# Patient Record
Sex: FEMALE | Race: BLACK OR AFRICAN AMERICAN
[De-identification: names, ages, dates, MRNs, and addresses within clinical notes are randomized per-mention and may not be internally consistent; named-entity substitution may affect disease eponyms.]

---

## 2018-07-24 ENCOUNTER — HOSPITAL ENCOUNTER (OUTPATIENT)
Dept: HOSPITAL 62 - RAD | Age: 23
End: 2018-07-24
Attending: NURSE PRACTITIONER
Payer: SELF-PAY

## 2018-07-24 DIAGNOSIS — Z34.82: Primary | ICD-10-CM

## 2018-07-24 PROCEDURE — 76801 OB US < 14 WKS SINGLE FETUS: CPT

## 2018-07-24 NOTE — RADIOLOGY REPORT (SQ)
EXAM DESCRIPTION:  U/S [id number] TRNABD 1GES W/ODOP



COMPLETED DATE/TIME:  7/24/2018 3:37 pm



REASON FOR STUDY:  ENCOUNTER FOR SUPRVSN OF NORMAL PREGNANCY, SECOND TRIMESTER Z34.82  ENCOUNTER FOR 
SUPRVSN OF NORMAL PREGNANCY, SECOND TRI



COMPARISON:  None.



TECHNIQUE:  Static and Dynamic grayscale imaging performed of gravid uterus using transabdominal appr
oac.  Additional selected color Doppler and spectral images recorded.  All stored on PACS.



LIMITATIONS:  None.



FINDINGS:  EGA: 21 weeks 4 days

JEANETTE: 11/30/2018

EFW: 434 grams

PERCENTILE: Not calculated.

RENETTA: 13.5

PLACENTA: Posterior.  GRADE: I

FETAL PRESENTATION: Cephalic.

FETAL ANATOMY:

FETAL HEART RATE: 150 beats per minute.

FOUR CHAMBER HEART: Visualized.

THREE VESSEL CORD: Yes.

CORD INSERTION: Visualized.

KIDNEYS AND BLADDER: Visualized. Appear normal.

STOMACH: Visualized. Appears normal.

SPINE: Normal as visualized.

BRAIN AND LATERAL VENTRICLES: Visualized. Appear normal.

OTHER: No other significant finding.

MATERNAL ADNEXA: Maternal ovaries not visualized.

CERVICAL LENGTH: 3.8 cm   Closed.

OTHER: No other significant finding.



IMPRESSION:  LIVING INTRAUTERINE PREGNANCY.

ESTIMATED GESTATIONAL AGE 21 weeks 4 days.

NO VISUALIZED ANOMALIES.

Trimester of pregnancy: Second trimester - 13 weeks 1 day to 27 weeks 6 days.



TECHNICAL DOCUMENTATION:  JOB ID:  [id number]

 2011 [employer]- All Rights Reserved



Reading location - IP/workstation name: [email or web address]

## 2018-11-11 ENCOUNTER — HOSPITAL ENCOUNTER (INPATIENT)
Dept: HOSPITAL 62 - LC | Age: 23
LOS: 2 days | Discharge: HOME | End: 2018-11-13
Attending: OBSTETRICS & GYNECOLOGY | Admitting: OBSTETRICS & GYNECOLOGY
Payer: MEDICAID

## 2018-11-11 DIAGNOSIS — F14.10: ICD-10-CM

## 2018-11-11 DIAGNOSIS — F17.210: ICD-10-CM

## 2018-11-11 DIAGNOSIS — Z23: ICD-10-CM

## 2018-11-11 DIAGNOSIS — Z3A.38: ICD-10-CM

## 2018-11-11 DIAGNOSIS — O99.323: ICD-10-CM

## 2018-11-11 DIAGNOSIS — F12.10: ICD-10-CM

## 2018-11-11 DIAGNOSIS — D50.9: ICD-10-CM

## 2018-11-11 LAB
ADD MANUAL DIFF: NO
BARBITURATES UR QL SCN: NEGATIVE
BASOPHILS # BLD AUTO: 0 10^3/UL (ref 0–0.2)
BASOPHILS NFR BLD AUTO: 0.2 % (ref 0–2)
CHLAM PCR: NOT DETECTED
EOSINOPHIL # BLD AUTO: 0.1 10^3/UL (ref 0–0.6)
EOSINOPHIL NFR BLD AUTO: 0.8 % (ref 0–6)
ERYTHROCYTE [DISTWIDTH] IN BLOOD BY AUTOMATED COUNT: 14.2 % (ref 11.5–14)
GON PCR: NOT DETECTED
HCT VFR BLD CALC: 30.4 % (ref 36–47)
HGB BLD-MCNC: 10.1 G/DL (ref 12–15.5)
LYMPHOCYTES # BLD AUTO: 1.5 10^3/UL (ref 0.5–4.7)
LYMPHOCYTES NFR BLD AUTO: 20.2 % (ref 13–45)
MCH RBC QN AUTO: 29.9 PG (ref 27–33.4)
MCHC RBC AUTO-ENTMCNC: 33.4 G/DL (ref 32–36)
MCV RBC AUTO: 90 FL (ref 80–97)
METHADONE UR QL SCN: NEGATIVE
MONOCYTES # BLD AUTO: 0.5 10^3/UL (ref 0.1–1.4)
MONOCYTES NFR BLD AUTO: 6.6 % (ref 3–13)
NEUTROPHILS # BLD AUTO: 5.2 10^3/UL (ref 1.7–8.2)
NEUTS SEG NFR BLD AUTO: 72.2 % (ref 42–78)
PCP UR QL SCN: NEGATIVE
PLATELET # BLD: 170 10^3/UL (ref 150–450)
RBC # BLD AUTO: 3.39 10^6/UL (ref 3.72–5.28)
RUBELLA INTERPRETATION: POSITIVE
RUBV IGG SER-ACNC: 19.8 IU/ML
TOTAL CELLS COUNTED % (AUTO): 100 %
URINE AMPHETAMINES SCREEN: NEGATIVE
URINE BENZODIAZEPINES SCREEN: NEGATIVE
URINE COCAINE SCREEN: (no result)
URINE MARIJUANA (THC) SCREEN: (no result)
WBC # BLD AUTO: 7.2 10^3/UL (ref 4–10.5)

## 2018-11-11 PROCEDURE — 90686 IIV4 VACC NO PRSV 0.5 ML IM: CPT

## 2018-11-11 PROCEDURE — 36415 COLL VENOUS BLD VENIPUNCTURE: CPT

## 2018-11-11 PROCEDURE — 85660 RBC SICKLE CELL TEST: CPT

## 2018-11-11 PROCEDURE — 80307 DRUG TEST PRSMV CHEM ANLYZR: CPT

## 2018-11-11 PROCEDURE — 87491 CHLMYD TRACH DNA AMP PROBE: CPT

## 2018-11-11 PROCEDURE — 87077 CULTURE AEROBIC IDENTIFY: CPT

## 2018-11-11 PROCEDURE — 85027 COMPLETE CBC AUTOMATED: CPT

## 2018-11-11 PROCEDURE — 86804 HEP C AB TEST CONFIRM: CPT

## 2018-11-11 PROCEDURE — 80349 CANNABINOIDS NATURAL: CPT

## 2018-11-11 PROCEDURE — 86787 VARICELLA-ZOSTER ANTIBODY: CPT

## 2018-11-11 PROCEDURE — 86850 RBC ANTIBODY SCREEN: CPT

## 2018-11-11 PROCEDURE — 90471 IMMUNIZATION ADMIN: CPT

## 2018-11-11 PROCEDURE — 86803 HEPATITIS C AB TEST: CPT

## 2018-11-11 PROCEDURE — 85025 COMPLETE CBC W/AUTO DIFF WBC: CPT

## 2018-11-11 PROCEDURE — G0480 DRUG TEST DEF 1-7 CLASSES: HCPCS

## 2018-11-11 PROCEDURE — 4A1HXCZ MONITORING OF PRODUCTS OF CONCEPTION, CARDIAC RATE, EXTERNAL APPROACH: ICD-10-PCS | Performed by: OBSTETRICS & GYNECOLOGY

## 2018-11-11 PROCEDURE — 84112 EVAL AMNIOTIC FLUID PROTEIN: CPT

## 2018-11-11 PROCEDURE — 86901 BLOOD TYPING SEROLOGIC RH(D): CPT

## 2018-11-11 PROCEDURE — 86900 BLOOD TYPING SEROLOGIC ABO: CPT

## 2018-11-11 PROCEDURE — 87340 HEPATITIS B SURFACE AG IA: CPT

## 2018-11-11 PROCEDURE — 87591 N.GONORRHOEAE DNA AMP PROB: CPT

## 2018-11-11 PROCEDURE — 80353 DRUG SCREENING COCAINE: CPT

## 2018-11-11 PROCEDURE — 86701 HIV-1ANTIBODY: CPT

## 2018-11-11 PROCEDURE — 86592 SYPHILIS TEST NON-TREP QUAL: CPT

## 2018-11-11 PROCEDURE — 90715 TDAP VACCINE 7 YRS/> IM: CPT

## 2018-11-11 PROCEDURE — 88307 TISSUE EXAM BY PATHOLOGIST: CPT

## 2018-11-11 PROCEDURE — 86762 RUBELLA ANTIBODY: CPT

## 2018-11-11 PROCEDURE — G0008 ADMIN INFLUENZA VIRUS VAC: HCPCS

## 2018-11-11 PROCEDURE — 86695 HERPES SIMPLEX TYPE 1 TEST: CPT

## 2018-11-11 PROCEDURE — 87081 CULTURE SCREEN ONLY: CPT

## 2018-11-11 RX ADMIN — FAMOTIDINE SCH MG: 20 TABLET, FILM COATED ORAL at 21:36

## 2018-11-11 RX ADMIN — IBUPROFEN SCH MG: 800 TABLET, FILM COATED ORAL at 21:36

## 2018-11-11 RX ADMIN — PENICILLIN G POTASSIUM SCH: 5000000 POWDER, FOR SOLUTION INTRAMUSCULAR; INTRAPLEURAL; INTRATHECAL; INTRAVENOUS at 21:41

## 2018-11-11 RX ADMIN — PENICILLIN G POTASSIUM SCH MLS/HR: 5000000 POWDER, FOR SOLUTION INTRAMUSCULAR; INTRAPLEURAL; INTRATHECAL; INTRAVENOUS at 17:48

## 2018-11-11 NOTE — WARNING SIGNS IN BABIES
=================================================================

VOD Warning Signs

=================================================================

Datetime Report Generated by N: 11/11/2018 19:37

   

VOD#608 -Warning Signs in Babies:  Viewed with Parent(s)/Family   

   (11/11/2018 19:30:Tara Ram RN)

## 2018-11-11 NOTE — DELIVERY SUMMARY
=================================================================

Del Sum A-C

=================================================================

Datetime Report Generated by CPN: 2018 21:03

   

   

=================================================================

DELIVERY PERSONNEL

=================================================================

   

DELIVERY PERSONNEL:  [id number]

Delivery Doctor::  Zora Gutierrez MD

Anesthesiologist::  Latoya Starr MD

Labor and Delivery Nurse::  Winter Cole RN

Labor and Delivery Nurse::  Mayi Blackman RN

Scrub Tech/CNA:  Alisa Magana, ST

   

=================================================================

MATERNAL INFORMATION

=================================================================

   

Delivery Anesthesia:  Epidural

Medications After Delivery:  Pitocin Bolus-Please Comment

Meds After Delivery Comment:  Pitocin 20 units in 1000mL NS

Maternal Complications:  Premature Rupture of Membranes

Provider Comments:  VMI delivered in NIA presentation.  No nuchal cord.

   SHoulders and body delivered without difficulty.  Cord doubly

   clamped and cut and infant to maternal abdomen for NRP.  Placenta

   delivered intact spontaneously.  No perineal lacerations.  FF at U. 

   Mother and baby stable upon provider leaving the room.

   

=================================================================

LABOR SUMMARY

=================================================================

   

EDC:  2018 00:00

No. Babies in Womb:  1

 Attempted:  No

Labor Anesthesia:  Epidural

   

=================================================================

LABOR INFORMATION

=================================================================

   

Reason for Induction:  Not Applicable

Onset of Labor:  2018 12:45

Complete Dilatation:  2018 18:50

Oxytocin:  N/A

Group B Beta Strep:  unknown

Antibiotics # of Doses:  2

Antibiotics Time of Last Dose:  

Name of Antibiotic Given:  PCN

Steroids Given:  None

Reason Steroids Not Administered:  Not Applicable

   

=================================================================

MEMBRANES

=================================================================

   

Membranes Rupture Method:  Spontaneous

Rupture of Membranes:  2018 10:30

Length of Rupture (hr):  8.42

Amniotic Fluid Color:  Clear

Amniotic Fluid Amount:  Small

Amniotic Fluid Odor:  Normal

   

=================================================================

STAGES OF LABOR

=================================================================

   

Stage 1 hr:  6

Stage 1 min:  5

Stage 2 hr:  0

Stage 2 min:  5

Stage 3 hr:  0

Stage 3 min:  2

Total Time in Labor hr:  6

Total Time in Labor min:  12

   

=================================================================

VAGINAL DELIVERY

=================================================================

   

Episiotomy:  None

Laceration #1:  None

Laceration Extension #1:  N/A

Laceration Repair:  Not Applicable

Sponge Count Correct:  Yes

Sharps Count Correct:  Yes

   

=================================================================

CSECTION DELIVERY

=================================================================

   

Primary Indication:  N/A

CSection Incidence:  N/A

Labor:  N/A

Elective:  N/A

CSection Incision:  N/A

   

=================================================================

BABY A INFORMATION

=================================================================

   

Infant Delivery Date/Time:  2018 18:55

Method of Delivery:  Vaginal

Born in Route :  No

:  N/A

Forceps:  N/A

Vacuum Extraction:  N/A

Shoulder Dystocia :  No

   

=================================================================

PRESENTATION/POSITION BABY A

=================================================================

   

Presentation:  Cephalic

Cephalic Presentation:  Vertex

Vertex Position:  Right Occipital Anterior

Breech Presentation:  N/A

   

=================================================================

PLACENTA INFORMATION BABY A

=================================================================

   

Placenta Delivery Time :  2018 18:57

Placenta Method of Delivery:  Spontaneous

Placenta Status:  Delivered

   

=================================================================

APGAR SCORES BABY A

=================================================================

   

Heart Rate 1 min:  >100 bpm

Resp Effort 1 min:  Good Cry

Reflex Irritability 1 min:  Cough or Sneeze or Pulls Away

Muscle Tone 1 min:  Active Motion

Color 1 min:  Blue/Pale

Resuscitation Effort 1 min:  Tactile Stimulation

APGAR SCORE 1 MIN:  8

Heart Rate 5 min:  >100 bpm

Resp Effort 5 min:  Good Cry

Reflex Irritability 5 min:  Cough or Sneeze or Pulls Away

Muscle Tone 5 min:  Active Motion

Color 5 min:  Body Pink, Extremities Blue

Resuscitation Effort 5 min:  Tactile Stimulation

APGAR SCORE 5 MIN:  9

   

=================================================================

INFANT INFORMATION BABY A

=================================================================

   

Gestational Age at Delivery:  38.4

Gestational Status:  Early Term- 37- 38.6 Weeks

Infant Outcome :  Liveborn

Infant Condition :  Stable

Infant Sex:  Male

   

=================================================================

IDENTIFICATION BABY A

=================================================================

   

Infant Verification Date/Time:  2018 19:24

ID Band Number:  [id number]

Mother's Name Verified:  Yes

Infant Medical Record Number:  [id number]

RN Verifying Infant:  B Baidy RN

   

=================================================================

WEIGHT/LENGTH BABY A

=================================================================

   

Infant Birthweight (gm):  2330

Infant Weight (lb):  5

Infant Weight (oz):  2

Infant Length (in):  17.50

Infant Length (cm):  44.45

   

=================================================================

CORD INFORMATION BABY A

=================================================================

   

No. Cord Vessels:  3

Nuchal Cord :  N/A

Cord Blood Taken:  Yes-For Eval (Mom's Blood Type - or O+)

Infant Suction:  Mouth; Nose

   

=================================================================

ASSESSMENT BABY A

=================================================================

   

Skin to Skin:  Yes

   

=================================================================

BABY B INFORMATION

=================================================================

   

 :  N/A

   

=================================================================

SIGNATURES

=================================================================

   

Signature:  Electronically signed by MD JAMES Hernandez) on

   2018 at 19:04  with User ID: [email or web address]
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
## 2018-11-12 LAB
ERYTHROCYTE [DISTWIDTH] IN BLOOD BY AUTOMATED COUNT: 13.9 % (ref 11.5–14)
HCT VFR BLD CALC: 27.5 % (ref 36–47)
HGB BLD-MCNC: 9.4 G/DL (ref 12–15.5)
MCH RBC QN AUTO: 30.5 PG (ref 27–33.4)
MCHC RBC AUTO-ENTMCNC: 34.3 G/DL (ref 32–36)
MCV RBC AUTO: 89 FL (ref 80–97)
PLATELET # BLD: 147 10^3/UL (ref 150–450)
RBC # BLD AUTO: 3.09 10^6/UL (ref 3.72–5.28)
WBC # BLD AUTO: 10.2 10^3/UL (ref 4–10.5)

## 2018-11-12 RX ADMIN — PENICILLIN G POTASSIUM SCH: 5000000 POWDER, FOR SOLUTION INTRAMUSCULAR; INTRAPLEURAL; INTRATHECAL; INTRAVENOUS at 05:13

## 2018-11-12 RX ADMIN — DOCUSATE SODIUM SCH MG: 100 CAPSULE, LIQUID FILLED ORAL at 17:39

## 2018-11-12 RX ADMIN — FAMOTIDINE SCH MG: 20 TABLET, FILM COATED ORAL at 21:03

## 2018-11-12 RX ADMIN — IBUPROFEN SCH MG: 800 TABLET, FILM COATED ORAL at 21:03

## 2018-11-12 RX ADMIN — IBUPROFEN SCH MG: 800 TABLET, FILM COATED ORAL at 16:23

## 2018-11-12 RX ADMIN — SENNOSIDES, DOCUSATE SODIUM SCH EACH: 50; 8.6 TABLET, FILM COATED ORAL at 09:28

## 2018-11-12 RX ADMIN — DOCUSATE SODIUM SCH MG: 100 CAPSULE, LIQUID FILLED ORAL at 09:28

## 2018-11-12 RX ADMIN — FAMOTIDINE SCH MG: 20 TABLET, FILM COATED ORAL at 09:28

## 2018-11-12 RX ADMIN — IBUPROFEN SCH MG: 800 TABLET, FILM COATED ORAL at 05:19

## 2018-11-12 RX ADMIN — FERROUS SULFATE TAB 325 MG (65 MG ELEMENTAL FE) SCH MG: 325 (65 FE) TAB at 09:28

## 2018-11-12 RX ADMIN — FERROUS SULFATE TAB 325 MG (65 MG ELEMENTAL FE) SCH MG: 325 (65 FE) TAB at 17:39

## 2018-11-12 RX ADMIN — Medication SCH CAP: at 09:28

## 2018-11-12 NOTE — PDOC PROGRESS REPORT
Subjective-OB


Progress Note for:: 18


Subjective: 





Sitting on side of bed, mother at BS holding baby, bottle feeding





Physical Exam (OB)


Vital Signs: 


 











Temp Pulse Resp BP Pulse Ox


 


 97.8 F   70   16   109/57 L  100 


 


 18 07:37  18 07:37  18 07:37  18 07:37  18 07:37








 Intake & Output











 18





 06:59 06:59 06:59


 


Intake Total   1550


 


Balance   1550


 


Weight  55 kg 














- PIH/Pre-Eclampsia


Clonus: Negative


Headache: Absent


Epigastric Pain: No


Visual Changes: No





- Lochia


Lochia Amount: Scant < 10 ml


Lochia Color: Rubra/Red





- Abdomen


Description: Tender, Soft


Hernia Present: No


Fundal Description: Firm, Midline


Fundal Height: u/u - u/2





Objective-Diagnostic


Laboratory: 


 





 18 05:40 





 











  18





  13:59 13:59 05:40


 


WBC  7.2   10.2


 


RBC  3.39 L   3.09 L


 


Hgb  10.1 L   9.4 L


 


Hct  30.4 L   27.5 L


 


MCV  90   89


 


MCH  29.9   30.5


 


MCHC  33.4   34.3


 


RDW  14.2 H   13.9


 


Plt Count  170   147 L


 


Seg Neutrophils %  72.2  


 


Lymphocytes %  20.2  


 


Monocytes %  6.6  


 


Eosinophils %  0.8  


 


Basophils %  0.2  


 


Absolute Neutrophils  5.2  


 


Absolute Lymphocytes  1.5  


 


Absolute Monocytes  0.5  


 


Absolute Eosinophils  0.1  


 


Absolute Basophils  0.0  


 


Blood Type   O POSITIVE 


 


Antibody Screen   TNP 














Assessment and Plan(PN)





- Assessment and Plan


(1) Anemia


Qualifiers: 


   Anemia type: iron deficiency 


Is this a current diagnosis for this admission?: Yes   





(2) Vaginal delivery


Is this a current diagnosis for this admission?: Yes   





(3) Spontaneous rupture of membranes


Is this a current diagnosis for this admission?: Yes   





(4) Limited prenatal care


Qualifiers: 


   Trimester: unspecified trimester   Qualified Code(s): O09.30 - Supervision 

of pregnancy with insufficient  care, unspecified trimester   


Is this a current diagnosis for this admission?: Yes   





(5) Marijuana abuse


Is this a current diagnosis for this admission?: Yes   





(6) Cocaine abuse


Is this a current diagnosis for this admission?: Yes   





(7) Smoker


Is this a current diagnosis for this admission?: Yes   





- Time Spent with Patient


Time with patient: Less than 15 minutes


Medications reviewed and adjusted accordingly: Yes





- Disposition


Anticipated Discharge: Home


Within: within 24 hours

## 2018-11-13 VITALS — DIASTOLIC BLOOD PRESSURE: 64 MMHG | SYSTOLIC BLOOD PRESSURE: 110 MMHG

## 2018-11-13 LAB
HCV AB SER IA-ACNC: <0.1 S/CO RATIO (ref 0–0.9)
VZV IGG SER IA-ACNC: 505 INDEX

## 2018-11-13 PROCEDURE — 3E02340 INTRODUCTION OF INFLUENZA VACCINE INTO MUSCLE, PERCUTANEOUS APPROACH: ICD-10-PCS | Performed by: OBSTETRICS & GYNECOLOGY

## 2018-11-13 PROCEDURE — 3E0234Z INTRODUCTION OF SERUM, TOXOID AND VACCINE INTO MUSCLE, PERCUTANEOUS APPROACH: ICD-10-PCS | Performed by: OBSTETRICS & GYNECOLOGY

## 2018-11-13 RX ADMIN — DOCUSATE SODIUM SCH MG: 100 CAPSULE, LIQUID FILLED ORAL at 18:32

## 2018-11-13 RX ADMIN — FERROUS SULFATE TAB 325 MG (65 MG ELEMENTAL FE) SCH MG: 325 (65 FE) TAB at 18:32

## 2018-11-13 RX ADMIN — IBUPROFEN SCH MG: 800 TABLET, FILM COATED ORAL at 14:03

## 2018-11-13 RX ADMIN — IBUPROFEN SCH MG: 800 TABLET, FILM COATED ORAL at 05:48

## 2018-11-13 RX ADMIN — FERROUS SULFATE TAB 325 MG (65 MG ELEMENTAL FE) SCH MG: 325 (65 FE) TAB at 09:39

## 2018-11-13 RX ADMIN — DOCUSATE SODIUM SCH MG: 100 CAPSULE, LIQUID FILLED ORAL at 09:39

## 2018-11-13 RX ADMIN — SENNOSIDES, DOCUSATE SODIUM SCH EACH: 50; 8.6 TABLET, FILM COATED ORAL at 09:39

## 2018-11-13 RX ADMIN — Medication SCH CAP: at 09:39

## 2018-11-13 RX ADMIN — FAMOTIDINE SCH MG: 20 TABLET, FILM COATED ORAL at 09:39

## 2018-11-13 NOTE — PDOC DISCHARGE SUMMARY
Final Diagnosis


Discharge Date: 11/13/18





- Final Diagnosis


(1) Anemia


Is this a current diagnosis for this admission?: Yes   





(2) Limited prenatal care


Is this a current diagnosis for this admission?: Yes   





(3) Vaginal delivery


Is this a current diagnosis for this admission?: Yes   





Discharge Data





- Discharge Medication


Prescriptions: 


Ibuprofen [Motrin 800 mg Tablet] 800 mg PO Q8HP PRN #60 tablet


 PRN Reason: 


Home Medications: 








Ferrous Sulfate [Feosol 325 mg Tablet] 325 mg PO BID  tablet 11/13/18 


Ibuprofen [Motrin 800 mg Tablet] 800 mg PO Q8HP PRN #60 tablet 11/13/18 


Prenatal Vit/Dha [Prenatal Multi + Dha Capsule] 1 cap PO DAILY  capsule 11/13/ 18 








Prenatal Procedures: NST


Intrapartum Procedure(s): Spontaneous Vaginal Delivery





- Diagnosis Test


Laboratory: 


 











Temp Pulse Resp BP Pulse Ox


 


 97.9 F   57 L  14   110/64   100 


 


 11/13/18 09:02  11/13/18 09:02  11/13/18 09:02  11/13/18 09:02  11/13/18 09:02








 











  11/11/18 11/11/18 11/12/18





  12:11 13:59 05:40


 


RBC   3.39 L  3.09 L


 


Hgb   10.1 L  9.4 L


 


Hct   30.4 L  27.5 L


 


Urine Opiates Screen  NEGATIVE  














- Discharge information/Instructions


Discharge Activity: Balance Activity w/Rest, Pelvic Rest


Discharge Diet: Regular


Disposition: HOME, SELF-CARE


Follow up with: [facility]


in: 4, Weeks

## 2019-01-19 ENCOUNTER — HOSPITAL ENCOUNTER (EMERGENCY)
Dept: HOSPITAL 62 - ER | Age: 24
Discharge: LEFT BEFORE DISCHARGE | End: 2019-01-19
Payer: MEDICAID

## 2019-01-19 DIAGNOSIS — Z53.21: Primary | ICD-10-CM
